# Patient Record
Sex: MALE | Race: OTHER | HISPANIC OR LATINO | Employment: UNEMPLOYED | URBAN - METROPOLITAN AREA
[De-identification: names, ages, dates, MRNs, and addresses within clinical notes are randomized per-mention and may not be internally consistent; named-entity substitution may affect disease eponyms.]

---

## 2024-07-07 ENCOUNTER — HOSPITAL ENCOUNTER (EMERGENCY)
Facility: HOSPITAL | Age: 34
Discharge: HOME | End: 2024-07-07
Attending: STUDENT IN AN ORGANIZED HEALTH CARE EDUCATION/TRAINING PROGRAM
Payer: COMMERCIAL

## 2024-07-07 VITALS
HEART RATE: 75 BPM | HEIGHT: 73 IN | SYSTOLIC BLOOD PRESSURE: 129 MMHG | OXYGEN SATURATION: 99 % | RESPIRATION RATE: 16 BRPM | DIASTOLIC BLOOD PRESSURE: 70 MMHG | BODY MASS INDEX: 22.53 KG/M2 | WEIGHT: 170 LBS | TEMPERATURE: 98 F

## 2024-07-07 DIAGNOSIS — R06.2 WHEEZING: ICD-10-CM

## 2024-07-07 DIAGNOSIS — J06.9 VIRAL URI WITH COUGH: Primary | ICD-10-CM

## 2024-07-07 LAB
CTP QC/QA: YES
MOLECULAR STREP A: NEGATIVE
POC MOLECULAR INFLUENZA A AGN: NEGATIVE
POC MOLECULAR INFLUENZA B AGN: NEGATIVE
SARS-COV-2 RDRP RESP QL NAA+PROBE: NEGATIVE

## 2024-07-07 PROCEDURE — 99284 EMERGENCY DEPT VISIT MOD MDM: CPT | Mod: 25

## 2024-07-07 PROCEDURE — 87502 INFLUENZA DNA AMP PROBE: CPT

## 2024-07-07 PROCEDURE — 87635 SARS-COV-2 COVID-19 AMP PRB: CPT

## 2024-07-07 PROCEDURE — 94760 N-INVAS EAR/PLS OXIMETRY 1: CPT

## 2024-07-07 PROCEDURE — 25000003 PHARM REV CODE 250

## 2024-07-07 PROCEDURE — 94644 CONT INHLJ TX 1ST HOUR: CPT

## 2024-07-07 PROCEDURE — 25000242 PHARM REV CODE 250 ALT 637 W/ HCPCS

## 2024-07-07 PROCEDURE — 87651 STREP A DNA AMP PROBE: CPT

## 2024-07-07 RX ORDER — BENZONATATE 100 MG/1
100 CAPSULE ORAL 3 TIMES DAILY PRN
Qty: 20 CAPSULE | Refills: 0 | Status: SHIPPED | OUTPATIENT
Start: 2024-07-07 | End: 2024-07-17

## 2024-07-07 RX ORDER — ACETAMINOPHEN 500 MG
500 TABLET ORAL EVERY 6 HOURS PRN
Qty: 20 TABLET | Refills: 0 | Status: SHIPPED | OUTPATIENT
Start: 2024-07-07

## 2024-07-07 RX ORDER — IPRATROPIUM BROMIDE AND ALBUTEROL SULFATE 2.5; .5 MG/3ML; MG/3ML
9 SOLUTION RESPIRATORY (INHALATION) ONCE
Status: COMPLETED | OUTPATIENT
Start: 2024-07-07 | End: 2024-07-07

## 2024-07-07 RX ORDER — IPRATROPIUM BROMIDE AND ALBUTEROL SULFATE 2.5; .5 MG/3ML; MG/3ML
3 SOLUTION RESPIRATORY (INHALATION)
Status: DISCONTINUED | OUTPATIENT
Start: 2024-07-07 | End: 2024-07-07

## 2024-07-07 RX ORDER — GUAIFENESIN 100 MG/5ML
100-200 SOLUTION ORAL EVERY 4 HOURS PRN
Qty: 60 ML | Refills: 0 | Status: SHIPPED | OUTPATIENT
Start: 2024-07-07 | End: 2024-07-17

## 2024-07-07 RX ORDER — BENZONATATE 100 MG/1
200 CAPSULE ORAL
Status: COMPLETED | OUTPATIENT
Start: 2024-07-07 | End: 2024-07-07

## 2024-07-07 RX ORDER — FLUTICASONE PROPIONATE 50 MCG
1 SPRAY, SUSPENSION (ML) NASAL 2 TIMES DAILY PRN
Qty: 15 G | Refills: 0 | Status: SHIPPED | OUTPATIENT
Start: 2024-07-07

## 2024-07-07 RX ORDER — METHYLPREDNISOLONE 4 MG/1
TABLET ORAL
Qty: 1 EACH | Refills: 0 | Status: SHIPPED | OUTPATIENT
Start: 2024-07-07 | End: 2024-07-28

## 2024-07-07 RX ORDER — ACETAMINOPHEN 500 MG
1000 TABLET ORAL
Status: COMPLETED | OUTPATIENT
Start: 2024-07-07 | End: 2024-07-07

## 2024-07-07 RX ORDER — CETIRIZINE HYDROCHLORIDE 10 MG/1
10 TABLET ORAL DAILY
Qty: 30 TABLET | Refills: 0 | Status: SHIPPED | OUTPATIENT
Start: 2024-07-07 | End: 2024-08-06

## 2024-07-07 RX ADMIN — IPRATROPIUM BROMIDE AND ALBUTEROL SULFATE 9 ML: .5; 3 SOLUTION RESPIRATORY (INHALATION) at 05:07

## 2024-07-07 RX ADMIN — ACETAMINOPHEN 1000 MG: 500 TABLET, FILM COATED ORAL at 04:07

## 2024-07-07 RX ADMIN — BENZONATATE 200 MG: 100 CAPSULE ORAL at 04:07

## 2024-07-07 NOTE — Clinical Note
"Macho Quinonesrey" Koch was seen and treated in our emergency department on 7/7/2024.  He may return to work on 07/10/2024.       If you have any questions or concerns, please don't hesitate to call.      Jayant Mcclendon PA-C"

## 2024-07-07 NOTE — ED TRIAGE NOTES
COVID-19 Concerns  Cough (Per pt he has had generalized body aches, fever and cough x 2 days).  Denies medical and surgical hx.

## 2024-07-07 NOTE — ED PROVIDER NOTES
Encounter Date: 7/7/2024       History     Chief Complaint   Patient presents with    COVID-19 Concerns    Cough     Per pt he has had generalized body aches, fever and cough x 2 days     Patient is a 34 y.o. male with no past medical history who presents to the Emergency Department for evaluation of URI symptoms x 2 days.  Symptoms include fever, body aches, sore throat, and cough.  He has taken tylenol with some relief. He is not vaccinated for COVID and the flu. Known sick contacts at work.  He denies headache, chest pain, shortness of breath, abdominal pain. Denies nausea, vomiting, diarrhea, sore throat, difficulty swallowing, or otalgia.     The history is provided by the patient.     Review of patient's allergies indicates:   Allergen Reactions    Pcn [penicillins] Other (See Comments)     Childhood allergy unknown reaction     History reviewed. No pertinent past medical history.  History reviewed. No pertinent surgical history.  No family history on file.  Social History     Tobacco Use    Smoking status: Never    Smokeless tobacco: Never     Review of Systems   Constitutional:  Positive for fever.   HENT:  Positive for sore throat. Negative for congestion, ear pain, rhinorrhea and trouble swallowing.    Respiratory:  Positive for cough. Negative for shortness of breath.    Cardiovascular:  Negative for chest pain.   Gastrointestinal:  Negative for abdominal pain, nausea and vomiting.   Genitourinary:  Negative for decreased urine volume.   Musculoskeletal:  Positive for myalgias. Negative for neck pain and neck stiffness.   Neurological:  Negative for dizziness, light-headedness and headaches.       Physical Exam     Initial Vitals [07/07/24 1610]   BP Pulse Resp Temp SpO2   126/70 92 18 98.1 °F (36.7 °C) 97 %      MAP       --         Physical Exam    Nursing note and vitals reviewed.  Constitutional: He appears well-developed and well-nourished.   HENT:   Head: Normocephalic and atraumatic.   Right Ear:  External ear normal.   Left Ear: External ear normal.   There is no posterior oropharyngeal erythema with a postnasal drip, no tonsillar swelling, no oropharyngeal exudates, uvula is midline. Normal dentition. No trismus.  No muffled voice. No submandibular swelling. Patient is tolerating secretions without difficulty.  Patient is speaking in full sentences on exam without difficulty.  Bilateral tympanic membranes are pearly gray without erythema, bulging, perforation.  There is no postauricular swelling, or overlying erythema or tenderness to palpation over mastoids bilaterally.    Neck: Carotid bruit is not present.   Normal range of motion.  Cardiovascular:  Normal rate, regular rhythm, normal heart sounds and intact distal pulses.     Exam reveals no gallop and no friction rub.       No murmur heard.  Pulmonary/Chest: No respiratory distress. He has wheezes. He has no rhonchi. He has no rales.   Abdominal: Abdomen is soft. Bowel sounds are normal. He exhibits no distension. There is no abdominal tenderness. There is no rebound and no guarding.   Musculoskeletal:         General: Normal range of motion.      Cervical back: Normal range of motion.     Neurological: He is alert and oriented to person, place, and time. GCS score is 15. GCS eye subscore is 4. GCS verbal subscore is 5. GCS motor subscore is 6.   Psychiatric: He has a normal mood and affect.         ED Course   Procedures  Labs Reviewed   SARS-COV-2 RDRP GENE   POCT INFLUENZA A/B MOLECULAR   POCT STREP A MOLECULAR          Imaging Results              X-Ray Chest PA And Lateral (Final result)  Result time 07/07/24 17:38:53      Final result by Manuel Alcaraz MD (07/07/24 17:38:53)                   Impression:      No acute abnormality.      Electronically signed by: Manuel Alcaraz  Date:    07/07/2024  Time:    17:38               Narrative:    EXAMINATION:  XR CHEST PA AND LATERAL    CLINICAL HISTORY:  Wheezing    TECHNIQUE:  PA and lateral  views of the chest were performed.    COMPARISON:  None    FINDINGS:  The lungs are clear, with normal appearance of pulmonary vasculature and no pleural effusion or pneumothorax.    The cardiac silhouette is normal in size. The hilar and mediastinal contours are unremarkable.    Bones are intact.                                       Medications   benzonatate capsule 200 mg (200 mg Oral Given 7/7/24 1645)   acetaminophen tablet 1,000 mg (1,000 mg Oral Given 7/7/24 1645)   albuterol-ipratropium 2.5 mg-0.5 mg/3 mL nebulizer solution 9 mL (9 mLs Nebulization Given 7/7/24 1725)     Medical Decision Making  This is an emergent evaluation of a 34 y.o. male with no past medical history who presents to the Emergency Department for evaluation of URI symptoms x 2 days.  Symptoms include fever, body aches, and cough.    Patient looks well clinically. Regular rate rhythm without murmurs.  No carotid bruits appreciated on exam.  There is some wheezing in the upper lung fields upon expiration bilaterally.  Abdomen is soft, nontender, non distended, with normal bowel sounds.     Differential diagnosis includes but is not limited to COVID, flu, other viral syndrome, pneumonia, bronchitis.    Workup initiated with viral swabs, chest x-ray, DuoNebs.  Vital signs, chart, labs, and/or imaging were all reviewed.  See ED course below and interpretations above. My overall impression is viral URI with cough.  Wheezing improved after DuoNebs.  Will discharge home with symptomatic medications as listed below. Patient is very well appearing, and in no acute distress. Vital signs are reassuring here in the emergency department, patient is afebrile, breathing comfortable, satting 97 % on room air. Patient/Caregiver is stable for discharge at this time.  Patient/Caregiver was informed of results and plan of care. Patient/Caregiver verbalized understanding of care plan. All questions and concerns were addressed. Discussed strict return  precautions with the patient/caregiver. Instructed follow up with primary care provider within 1 week.      Jayant Mcclendon PA-C    DISCLAIMER: This note was prepared with Original voice recognition transcription software. Garbled syntax, mangled pronouns, and other bizarre constructions may be attributed to that software system.       Amount and/or Complexity of Data Reviewed  Labs: ordered. Decision-making details documented in ED Course.  Radiology: ordered. Decision-making details documented in ED Course.    Risk  OTC drugs.  Prescription drug management.               ED Course as of 07/07/24 1817   Sun Jul 07, 2024   1701 POCT COVID-19 Rapid Screening  COVID negative. [TM]   1701 POCT Strep A, Molecular  Strep negative. [TM]   1701 POCT Influenza A/B Molecular  Flu negative. [TM]   1742 X-Ray Chest PA And Lateral  The lungs are clear, with normal appearance of pulmonary vasculature and no pleural effusion or pneumothorax.     The cardiac silhouette is normal in size. The hilar and mediastinal contours are unremarkable.      [TM]      ED Course User Index  [TM] Jayant Mcclendon PA-C                           Clinical Impression:  Final diagnoses:  [R06.2] Wheezing  [J06.9] Viral URI with cough (Primary)          ED Disposition Condition    Discharge Stable          ED Prescriptions       Medication Sig Dispense Start Date End Date Auth. Provider    benzonatate (TESSALON) 100 MG capsule Take 1 capsule (100 mg total) by mouth 3 (three) times daily as needed. 20 capsule 7/7/2024 7/17/2024 Jayant Mcclendon PA-C    guaiFENesin 100 mg/5 ml (ROBITUSSIN) 100 mg/5 mL syrup Take 5-10 mLs (100-200 mg total) by mouth every 4 (four) hours as needed for Cough. 60 mL 7/7/2024 7/17/2024 Jayant Mcclendon PA-C    cetirizine (ZYRTEC) 10 MG tablet Take 1 tablet (10 mg total) by mouth once daily. 30 tablet 7/7/2024 8/6/2024 Jayant Mcclendon PA-C    fluticasone propionate (FLONASE) 50 mcg/actuation nasal spray 1 spray (50 mcg  total) by Each Nostril route 2 (two) times daily as needed for Rhinitis. 15 g 7/7/2024 -- Jayant Mcclendon PA-C    methylPREDNISolone (MEDROL DOSEPACK) 4 mg tablet Follow directions on packaging 1 each 7/7/2024 7/28/2024 Jayant Mcclendon PA-C    acetaminophen (TYLENOL) 500 MG tablet Take 1 tablet (500 mg total) by mouth every 6 (six) hours as needed for Pain. 20 tablet 7/7/2024 -- Jayant Mcclendon PA-C          Follow-up Information       Follow up With Specialties Details Why Contact Info    [facility] Emergency Medicine Go to  As needed, If symptoms worsen, or new symptoms develop [address] [facility] [place] [address] [phone]    Primary care doctor  Schedule an appointment as soon as possible for a visit in 3 days               Jayant Mcclendon PA-C  07/07/24 0908

## 2024-07-07 NOTE — DISCHARGE INSTRUCTIONS
Lo atendieron hoy en el departamento de emergencias por síntomas virales.  Murali negativo en la prueba de COVID, gripe, estreptococo.  Blanca radiografía de tórax fue normal. [place] todos los medicamentos según lo prescrito y nanci comentamos.  Julius un seguimiento con un especialista si así se lo indica.  Es importante recordar que algunos problemas son difíciles de diagnosticar y es posible que no se detecten lexie blanca visita al Departamento de Emergencias. Asegúrese de hacer un seguimiento con blanca médico de atención primaria y revisar con él todos los análisis de laboratorio, imágenes y pruebas que se realizaron lexie esta visita. Algunas pruebas/laboratorios pueden estar fuera del rango normal y requerir un seguimiento que no sea de emergencia y anastacio investigación adicional para ayudar a diagnosticar/excluir/prevenir complicaciones u otras afecciones médicas. Regrese al departamento de emergencias ante cualquier síntoma nuevo o que empeore. Yasmin por permitirme cuidar de usbeltran capps, fue un placer. ¡Espero que te sientas mejor la próxima vez!